# Patient Record
Sex: FEMALE | Race: WHITE | NOT HISPANIC OR LATINO | URBAN - METROPOLITAN AREA
[De-identification: names, ages, dates, MRNs, and addresses within clinical notes are randomized per-mention and may not be internally consistent; named-entity substitution may affect disease eponyms.]

---

## 2018-07-31 ENCOUNTER — SKIN CHECK (OUTPATIENT)
Dept: URBAN - METROPOLITAN AREA CLINIC 7 | Facility: CLINIC | Age: 38
Setting detail: DERMATOLOGY
End: 2018-07-31

## 2018-07-31 DIAGNOSIS — Z08 ENCOUNTER FOR FOLLOW-UP EXAMINATION AFTER COMPLETED TREATMENT FOR MALIGNANT NEOPLASM: ICD-10-CM

## 2018-07-31 PROBLEM — D18.01 HEMANGIOMA OF SKIN AND SUBCUTANEOUS TISSUE: Status: RESOLVED | Noted: 2018-07-31

## 2018-07-31 PROBLEM — D18.01 HEMANGIOMA OF SKIN AND SUBCUTANEOUS TISSUE: Status: ACTIVE | Noted: 2018-07-31

## 2018-07-31 PROCEDURE — 99213 OFFICE O/P EST LOW 20 MIN: CPT

## 2019-08-05 ENCOUNTER — SKIN CHECK (OUTPATIENT)
Dept: URBAN - METROPOLITAN AREA CLINIC 7 | Facility: CLINIC | Age: 39
Setting detail: DERMATOLOGY
End: 2019-08-05

## 2019-08-05 DIAGNOSIS — L57.0 ACTINIC KERATOSIS: ICD-10-CM

## 2019-08-05 PROCEDURE — 99214 OFFICE O/P EST MOD 30 MIN: CPT

## 2019-08-05 RX ORDER — SPIRONOLACTONE 25 MG/1
1 TABLET TABLET, FILM COATED ORAL DAILY
Qty: 30 | Refills: 3 | Status: DISCONTINUED
Start: 2019-08-05 | End: 2019-11-06

## 2019-08-05 RX ORDER — TRETINOIN 0.25 MG/G
1 APPLICATION CREAM TOPICAL NIGHTLY
Qty: 20 | Refills: 3 | Status: DISCONTINUED
Start: 2019-08-05 | End: 2019-11-06

## 2019-11-06 ENCOUNTER — RX ONLY (RX ONLY)
Age: 39
End: 2019-11-06

## 2019-11-06 RX ORDER — SPIRONOLACTONE 25 MG/1
1 TABLET TABLET, FILM COATED ORAL DAILY
Qty: 30 | Refills: 3
Start: 2019-11-06

## 2019-11-06 RX ORDER — TRETINOIN 0.25 MG/G
1 APPLICATION CREAM TOPICAL NIGHTLY
Qty: 20 | Refills: 3
Start: 2019-11-06

## 2019-12-17 ENCOUNTER — SKIN CHECK (OUTPATIENT)
Dept: URBAN - METROPOLITAN AREA CLINIC 7 | Facility: CLINIC | Age: 39
Setting detail: DERMATOLOGY
End: 2019-12-17

## 2019-12-17 DIAGNOSIS — L30.4 ERYTHEMA INTERTRIGO: ICD-10-CM

## 2019-12-17 DIAGNOSIS — K13.0 DISEASES OF LIPS: ICD-10-CM

## 2019-12-17 DIAGNOSIS — L82.0 INFLAMED SEBORRHEIC KERATOSIS: ICD-10-CM

## 2019-12-17 DIAGNOSIS — L81.4 OTHER MELANIN HYPERPIGMENTATION: ICD-10-CM

## 2019-12-17 DIAGNOSIS — L82.1 OTHER SEBORRHEIC KERATOSIS: ICD-10-CM

## 2019-12-17 DIAGNOSIS — L57.0 ACTINIC KERATOSIS: ICD-10-CM

## 2019-12-17 PROCEDURE — 99212 OFFICE O/P EST SF 10 MIN: CPT

## 2019-12-17 RX ORDER — SPIRONOLACTONE 50 MG/1
1 TABLET TABLET, FILM COATED ORAL DAILY
Qty: 30 | Refills: 2 | Status: DISCONTINUED
Start: 2019-12-17 | End: 2020-08-03

## 2020-08-03 ENCOUNTER — RX ONLY (RX ONLY)
Age: 40
End: 2020-08-03

## 2020-08-03 RX ORDER — SPIRONOLACTONE 50 MG/1
1 TABLET TABLET, FILM COATED ORAL DAILY
Qty: 30 | Refills: 2
Start: 2020-08-03

## 2020-10-07 ENCOUNTER — RX ONLY (RX ONLY)
Age: 40
End: 2020-10-07

## 2020-10-07 RX ORDER — TRETINOIN 0.25 MG/G
1 APPLICATION CREAM TOPICAL NIGHTLY
Qty: 45 | Refills: 3
Start: 2020-10-07

## 2020-11-16 ENCOUNTER — COMPLETE SKIN EXAM (OUTPATIENT)
Dept: URBAN - METROPOLITAN AREA CLINIC 7 | Facility: CLINIC | Age: 40
Setting detail: DERMATOLOGY
End: 2020-11-16

## 2020-11-16 DIAGNOSIS — C44.42 SQUAMOUS CELL CARCINOMA OF SKIN OF SCALP AND NECK: ICD-10-CM

## 2020-11-16 PROCEDURE — 11102 TANGNTL BX SKIN SINGLE LES: CPT

## 2020-11-16 PROCEDURE — 99214 OFFICE O/P EST MOD 30 MIN: CPT

## 2020-11-16 RX ORDER — SPIRONOLACTONE 50 MG/1
1 TABLET TABLET, FILM COATED ORAL DAILY
Qty: 30 | Refills: 4
Start: 2020-11-16

## 2020-11-16 RX ORDER — TRETINOIN 0.25 MG/G
1 A SMALL AMOUNT CREAM TOPICAL NIGHTLY
Qty: 45 | Refills: 4
Start: 2020-11-16

## 2021-07-14 RX ORDER — KETOCONAZOLE 20 MG/G
1 A SMALL AMOUNT CREAM TOPICAL TWICE A DAY
Qty: 30 | Refills: 1
Start: 2021-07-14

## 2021-07-29 ENCOUNTER — RX ONLY (RX ONLY)
Age: 41
End: 2021-07-29

## 2021-07-29 RX ORDER — TRETINOIN 0.25 MG/G
CREAM TOPICAL
Qty: 45 | Refills: 0
Start: 2021-07-29

## 2021-10-22 ENCOUNTER — RX ONLY (RX ONLY)
Age: 41
End: 2021-10-22

## 2021-10-22 RX ORDER — SPIRONOLACTONE 50 MG/1
TABLET, FILM COATED ORAL
Qty: 30 | Refills: 0
Start: 2021-10-22

## 2024-12-23 ENCOUNTER — APPOINTMENT (OUTPATIENT)
Dept: URBAN - METROPOLITAN AREA CLINIC 213 | Age: 44
Setting detail: DERMATOLOGY
End: 2024-12-23

## 2024-12-23 DIAGNOSIS — L73.8 OTHER SPECIFIED FOLLICULAR DISORDERS: ICD-10-CM

## 2024-12-23 DIAGNOSIS — L82.0 INFLAMED SEBORRHEIC KERATOSIS: ICD-10-CM

## 2024-12-23 PROCEDURE — 17110 DESTRUCT B9 LESION 1-14: CPT

## 2024-12-23 PROCEDURE — 99202 OFFICE O/P NEW SF 15 MIN: CPT | Mod: 25

## 2024-12-23 PROCEDURE — OTHER COUNSELING: OTHER

## 2024-12-23 PROCEDURE — OTHER MIPS QUALITY: OTHER

## 2024-12-23 PROCEDURE — OTHER LIQUID NITROGEN: OTHER

## 2024-12-23 PROCEDURE — OTHER PRESCRIPTION: OTHER

## 2024-12-23 RX ORDER — TRETIONIN 0.5 MG/G
CREAM TOPICAL
Qty: 20 | Refills: 5 | Status: ERX | COMMUNITY
Start: 2024-12-23

## 2024-12-23 ASSESSMENT — LOCATION DETAILED DESCRIPTION DERM
LOCATION DETAILED: RIGHT SUPERIOR CENTRAL MALAR CHEEK
LOCATION DETAILED: INFERIOR MID FOREHEAD
LOCATION DETAILED: LEFT CLAVICULAR SKIN
LOCATION DETAILED: RIGHT MEDIAL SUPERIOR CHEST

## 2024-12-23 ASSESSMENT — LOCATION SIMPLE DESCRIPTION DERM
LOCATION SIMPLE: CHEST
LOCATION SIMPLE: LEFT CLAVICULAR SKIN
LOCATION SIMPLE: INFERIOR FOREHEAD
LOCATION SIMPLE: RIGHT CHEEK

## 2024-12-23 ASSESSMENT — LOCATION ZONE DERM
LOCATION ZONE: FACE
LOCATION ZONE: TRUNK

## 2024-12-23 NOTE — HPI: SKIN LESION
What Type Of Note Output Would You Prefer (Optional)?: Standard Output
How Severe Is Your Skin Lesion?: moderate
Has Your Skin Lesion Been Treated?: not been treated
Is This A New Presentation, Or A Follow-Up?: Skin Lesions
will see patient in ED/MICU